# Patient Record
Sex: FEMALE | Race: WHITE
[De-identification: names, ages, dates, MRNs, and addresses within clinical notes are randomized per-mention and may not be internally consistent; named-entity substitution may affect disease eponyms.]

---

## 2018-11-09 ENCOUNTER — HOSPITAL ENCOUNTER (EMERGENCY)
Dept: HOSPITAL 74 - JERFT | Age: 23
Discharge: HOME | End: 2018-11-09
Payer: COMMERCIAL

## 2018-11-09 VITALS — TEMPERATURE: 98.4 F | HEART RATE: 88 BPM | DIASTOLIC BLOOD PRESSURE: 70 MMHG | SYSTOLIC BLOOD PRESSURE: 133 MMHG

## 2018-11-09 VITALS — BODY MASS INDEX: 22.8 KG/M2

## 2018-11-09 DIAGNOSIS — Z3A.00: ICD-10-CM

## 2018-11-09 DIAGNOSIS — J06.9: ICD-10-CM

## 2018-11-09 DIAGNOSIS — O26.899: Primary | ICD-10-CM

## 2018-11-09 NOTE — PDOC
History of Present Illness





- General


Chief Complaint: Cold Symptoms


Stated Complaint: COLD SYMPTOMS


Time Seen by Provider: 11/09/18 17:14


History Source: Patient


Exam Limitations: No Limitations





- History of Present Illness


Timing/Duration: reports: getting worse





Past History





- Travel


Traveled outside of the country in the last 30 days: No





- Past Medical History


Allergies/Adverse Reactions: 


 Allergies











Allergy/AdvReac Type Severity Reaction Status Date / Time


 


No Known Allergies Allergy   Verified 11/09/18 18:57











Home Medications: 


Ambulatory Orders





Albuterol Sulfate Inhaler - [Ventolin HFA Inhaler -] 1 - 2 inh PO Q4H #1 

inhaler 11/09/18 








COPD: No





- Immunization History


Immunization Up to Date: Yes





- Suicide/Smoking/Psychosocial Hx


Smoking History: Never smoked


Information on smoking cessation initiated: No


Hx Alcohol Use: No


Drug/Substance Use Hx: No


Substance Use Type: None





**Review of Systems





- Review of Systems


Able to Perform ROS?: Yes


Is the patient limited English proficient: Yes


Constitutional: Yes: Symptoms Reported, See HPI, Malaise


HEENTM: No: Symptoms Reported


Respiratory: Yes: Symptoms reported, See HPI, Cough, Wheezing


ABD/GI: No: Symptoms Reported


Musculoskeletal: Yes: Symptoms Reported


Integumentary: Yes: Symptoms Reported, See HPI


Neurological: Yes: Symptoms reported, See HPI, Headache (frontal )


All Other Systems: Reviewed and Negative





*Physical Exam





- Vital Signs


 Last Vital Signs











Temp Pulse Resp BP Pulse Ox


 


 98.4 F   88   22 H  133/70   100 


 


 11/09/18 17:15  11/09/18 17:15  11/09/18 17:15  11/09/18 17:15  11/09/18 17:15














- Physical Exam


General Appearance: Yes: Nourished, Appropriately Dressed, Apparent Distress, 

Mild Distress


HEENT: positive: ANKUR, TMs Normal (congested but landmarks easily visualized), 

Pharynx Normal, Nasal Congestion, Rhinorrhea.  negative: Normal ENT Inspection


Neck: positive: Supple, Lymphadenopathy (R).  negative: Tender


Respiratory/Chest: positive: Wheezing.  negative: Lungs Clear (course with deep 

inspiration), Normal Breath Sounds


Cardiovascular: positive: Regular Rhythm


Gastrointestinal/Abdominal: positive: Normal Bowel Sounds, Soft.  negative: 

Tender


Extremity: positive: Normal Capillary Refill, Normal Inspection


Integumentary: positive: Dry, Pale


Neurologic: positive: CNs II-XII NML intact, Fully Oriented, Alert, Normal Mood/

Affect, Normal Response, Motor Strength 5/5





ED Treatment Course





- ADDITIONAL ORDERS


Additional order review: 


 Laboratory  Results











  11/09/18





  18:30


 


Urine HCG, Qual  Positive














Progress Note





- Progress Note


Progress Note: 





Urine pregnancy positive, related to patient was surprised by this information. 

Will follow-up with her private physician and understands need to follow-up 

with PMD





*DC/Admit/Observation/Transfer


Diagnosis at time of Disposition: 


 Congestion of nasal sinus





URI (upper respiratory infection)


Qualifiers:


 URI type: unspecified viral URI Qualified Code(s): J06.9 - Acute upper 

respiratory infection, unspecified








- Discharge Dispostion


Disposition: HOME


Condition at time of disposition: Stable





- Prescriptions


Prescriptions: 


Albuterol Sulfate Inhaler - [Ventolin HFA Inhaler -] 1 - 2 inh PO Q4H #1 inhaler





- Referrals





- Patient Instructions


Printed Discharge Instructions:  DI for Common Cold


Additional Instructions: 


Rest, drink lots of fluids: Teas, water, soups, Pedialyte


Saltwater gargles


Steamy showers/seem to face break up mucus


Avoid contact with others until fevers and cough resolved


Lots of handwashing and good hygiene





Continue over-the-counter medications for symptomatic relief


Tylenol or Motrin for fever and pain





Followup with private physician in one to 2 days as needed


Return to emergency department for worsened symptoms, fevers, dehydration





- Post Discharge Activity


Forms/Work/School Notes:  Back to Work

## 2018-11-09 NOTE — PDOC
Rapid Medical Evaluation


Time Seen by Provider: 11/09/18 17:14


Medical Evaluation: 





11/09/18 17:15


Pt presents to the ED for subjective fever, chills, cough and chest pain for 

one week. Pt states her symptoms started yesterday. Admits to feeling weak. She 

has been taking alkaseltzer plus with little relief


Exam: Nasal congestion, Lungs CTAB


Orders: upreg


Pt to proceed to ED for further evaluation





**Discharge Disposition





- Diagnosis


 Congestion of nasal sinus








- Referrals





- Patient Instructions





- Post Discharge Activity